# Patient Record
Sex: FEMALE | Race: WHITE | NOT HISPANIC OR LATINO
[De-identification: names, ages, dates, MRNs, and addresses within clinical notes are randomized per-mention and may not be internally consistent; named-entity substitution may affect disease eponyms.]

---

## 2019-07-17 PROBLEM — Z00.00 ENCOUNTER FOR PREVENTIVE HEALTH EXAMINATION: Status: ACTIVE | Noted: 2019-07-17

## 2019-07-22 ENCOUNTER — APPOINTMENT (OUTPATIENT)
Dept: GYNECOLOGIC ONCOLOGY | Facility: CLINIC | Age: 44
End: 2019-07-22
Payer: MEDICAID

## 2019-07-22 VITALS
DIASTOLIC BLOOD PRESSURE: 73 MMHG | OXYGEN SATURATION: 97 % | WEIGHT: 184.5 LBS | HEART RATE: 87 BPM | BODY MASS INDEX: 34.83 KG/M2 | HEIGHT: 61 IN | SYSTOLIC BLOOD PRESSURE: 109 MMHG

## 2019-07-22 DIAGNOSIS — N88.2 STRICTURE AND STENOSIS OF CERVIX UTERI: ICD-10-CM

## 2019-07-22 DIAGNOSIS — N95.1 MENOPAUSAL AND FEMALE CLIMACTERIC STATES: ICD-10-CM

## 2019-07-22 PROCEDURE — 99205 OFFICE O/P NEW HI 60 MIN: CPT

## 2019-07-22 RX ORDER — DIAZEPAM 5 MG/1
5 TABLET ORAL
Refills: 0 | Status: ACTIVE | COMMUNITY

## 2019-07-22 NOTE — HISTORY OF PRESENT ILLNESS
[FreeTextEntry1] : Problem\par 1) Thickened endometrium\par 2) Cervical Stenosis\par \par Previous Therapy\par 1) Pap nilm, HPV+ 5/2019\par 2) Pelvic US 6/20/19\par     a) Uterus 7x2.5x3.4, endometrial thickness of 0.7cm. Left ovary with 2.2 simple cyst\par \par \par 45 yo referred by Dr. Ty for pelvic pain.  she went to Dr. Ty 3 years ago and had a LEEP procedure.  She is not having constant pain now, she is feels like she is having a menstrual cycle with pain, but has no menses.  She does not have hot flashes often, but she does have night sweats.  She has not had a menstrual cycle in 2 years.  Her mother was 58 y when she went through menopause.  She takes tylenol for the pain.  She has had no menses since the LEEP. Had failed D&C, hysteroscopy 2 weeks ago with Dr. Ty

## 2019-08-13 ENCOUNTER — APPOINTMENT (OUTPATIENT)
Dept: GYNECOLOGIC ONCOLOGY | Facility: HOSPITAL | Age: 44
End: 2019-08-13

## 2019-09-10 ENCOUNTER — APPOINTMENT (OUTPATIENT)
Dept: GYNECOLOGIC ONCOLOGY | Facility: HOSPITAL | Age: 44
End: 2019-09-10

## 2019-09-25 RX ORDER — IBUPROFEN 800 MG/1
800 TABLET, FILM COATED ORAL 3 TIMES DAILY
Qty: 90 | Refills: 1 | Status: ACTIVE | COMMUNITY
Start: 2019-09-25 | End: 1900-01-01

## 2019-09-25 RX ORDER — DOCUSATE SODIUM 100 MG/1
100 CAPSULE ORAL TWICE DAILY
Qty: 60 | Refills: 0 | Status: ACTIVE | COMMUNITY
Start: 2019-09-25 | End: 1900-01-01

## 2019-09-25 RX ORDER — OXYCODONE AND ACETAMINOPHEN 5; 325 MG/1; MG/1
5-325 TABLET ORAL
Qty: 15 | Refills: 0 | Status: ACTIVE | COMMUNITY
Start: 2019-09-25 | End: 1900-01-01

## 2019-09-26 ENCOUNTER — RESULT REVIEW (OUTPATIENT)
Age: 44
End: 2019-09-26

## 2019-09-26 ENCOUNTER — APPOINTMENT (OUTPATIENT)
Dept: GYNECOLOGIC ONCOLOGY | Facility: HOSPITAL | Age: 44
End: 2019-09-26

## 2019-09-26 ENCOUNTER — OUTPATIENT (OUTPATIENT)
Dept: OUTPATIENT SERVICES | Facility: HOSPITAL | Age: 44
LOS: 1 days | Discharge: HOME | End: 2019-09-26
Payer: COMMERCIAL

## 2019-09-26 VITALS
SYSTOLIC BLOOD PRESSURE: 113 MMHG | TEMPERATURE: 98 F | RESPIRATION RATE: 16 BRPM | DIASTOLIC BLOOD PRESSURE: 74 MMHG | OXYGEN SATURATION: 97 % | HEIGHT: 60 IN | HEART RATE: 85 BPM | WEIGHT: 183.42 LBS

## 2019-09-26 VITALS
RESPIRATION RATE: 20 BRPM | OXYGEN SATURATION: 94 % | TEMPERATURE: 98 F | SYSTOLIC BLOOD PRESSURE: 100 MMHG | DIASTOLIC BLOOD PRESSURE: 58 MMHG | HEART RATE: 104 BPM

## 2019-09-26 LAB
BLD GP AB SCN SERPL QL: NEGATIVE — SIGNIFICANT CHANGE UP
GLUCOSE BLDC GLUCOMTR-MCNC: 86 MG/DL — SIGNIFICANT CHANGE UP (ref 70–99)
RH IG SCN BLD-IMP: POSITIVE — SIGNIFICANT CHANGE UP

## 2019-09-26 PROCEDURE — 86900 BLOOD TYPING SEROLOGIC ABO: CPT

## 2019-09-26 PROCEDURE — 86850 RBC ANTIBODY SCREEN: CPT

## 2019-09-26 PROCEDURE — 58552 LAPARO-VAG HYST INCL T/O: CPT

## 2019-09-26 PROCEDURE — 88307 TISSUE EXAM BY PATHOLOGIST: CPT

## 2019-09-26 PROCEDURE — 82962 GLUCOSE BLOOD TEST: CPT

## 2019-09-26 PROCEDURE — 58573 TLH W/T/O UTERUS OVER 250 G: CPT

## 2019-09-26 PROCEDURE — S2900: CPT

## 2019-09-26 PROCEDURE — 86901 BLOOD TYPING SEROLOGIC RH(D): CPT

## 2019-09-26 RX ORDER — METOCLOPRAMIDE HCL 10 MG
10 TABLET ORAL ONCE
Refills: 0 | Status: DISCONTINUED | OUTPATIENT
Start: 2019-09-26 | End: 2019-09-26

## 2019-09-26 RX ORDER — CELECOXIB 200 MG/1
400 CAPSULE ORAL ONCE
Refills: 0 | Status: COMPLETED | OUTPATIENT
Start: 2019-09-26 | End: 2019-09-26

## 2019-09-26 RX ORDER — KETOROLAC TROMETHAMINE 30 MG/ML
30 SYRINGE (ML) INJECTION ONCE
Refills: 0 | Status: DISCONTINUED | OUTPATIENT
Start: 2019-09-26 | End: 2019-09-26

## 2019-09-26 RX ORDER — HYDROMORPHONE HYDROCHLORIDE 2 MG/ML
0.5 INJECTION INTRAMUSCULAR; INTRAVENOUS; SUBCUTANEOUS ONCE
Refills: 0 | Status: DISCONTINUED | OUTPATIENT
Start: 2019-09-26 | End: 2019-09-26

## 2019-09-26 RX ORDER — GABAPENTIN 400 MG/1
600 CAPSULE ORAL ONCE
Refills: 0 | Status: COMPLETED | OUTPATIENT
Start: 2019-09-26 | End: 2019-09-26

## 2019-09-26 RX ORDER — ONDANSETRON 8 MG/1
4 TABLET, FILM COATED ORAL ONCE
Refills: 0 | Status: DISCONTINUED | OUTPATIENT
Start: 2019-09-26 | End: 2019-09-26

## 2019-09-26 RX ORDER — ACETAMINOPHEN 500 MG
1000 TABLET ORAL ONCE
Refills: 0 | Status: COMPLETED | OUTPATIENT
Start: 2019-09-26 | End: 2019-09-26

## 2019-09-26 RX ORDER — OXYCODONE AND ACETAMINOPHEN 5; 325 MG/1; MG/1
2 TABLET ORAL ONCE
Refills: 0 | Status: DISCONTINUED | OUTPATIENT
Start: 2019-09-26 | End: 2019-09-26

## 2019-09-26 RX ORDER — OXYCODONE HYDROCHLORIDE 5 MG/1
1 TABLET ORAL
Qty: 12 | Refills: 0
Start: 2019-09-26 | End: 2019-09-28

## 2019-09-26 RX ORDER — HEPARIN SODIUM 5000 [USP'U]/ML
5000 INJECTION INTRAVENOUS; SUBCUTANEOUS ONCE
Refills: 0 | Status: COMPLETED | OUTPATIENT
Start: 2019-09-26 | End: 2019-09-26

## 2019-09-26 RX ADMIN — Medication 1000 MILLIGRAM(S): at 11:29

## 2019-09-26 RX ADMIN — Medication 30 MILLIGRAM(S): at 17:39

## 2019-09-26 RX ADMIN — HEPARIN SODIUM 5000 UNIT(S): 5000 INJECTION INTRAVENOUS; SUBCUTANEOUS at 11:28

## 2019-09-26 RX ADMIN — CELECOXIB 400 MILLIGRAM(S): 200 CAPSULE ORAL at 11:30

## 2019-09-26 RX ADMIN — HYDROMORPHONE HYDROCHLORIDE 0.5 MILLIGRAM(S): 2 INJECTION INTRAMUSCULAR; INTRAVENOUS; SUBCUTANEOUS at 16:02

## 2019-09-26 RX ADMIN — OXYCODONE AND ACETAMINOPHEN 2 TABLET(S): 5; 325 TABLET ORAL at 18:00

## 2019-09-26 RX ADMIN — HYDROMORPHONE HYDROCHLORIDE 0.5 MILLIGRAM(S): 2 INJECTION INTRAMUSCULAR; INTRAVENOUS; SUBCUTANEOUS at 16:13

## 2019-09-26 RX ADMIN — Medication 30 MILLIGRAM(S): at 17:13

## 2019-09-26 RX ADMIN — OXYCODONE AND ACETAMINOPHEN 2 TABLET(S): 5; 325 TABLET ORAL at 17:56

## 2019-09-26 RX ADMIN — GABAPENTIN 600 MILLIGRAM(S): 400 CAPSULE ORAL at 11:29

## 2019-09-26 NOTE — BRIEF OPERATIVE NOTE - NSICDXBRIEFPROCEDURE_GEN_ALL_CORE_FT
PROCEDURES:  Hysterectomy, total, laparoscopic, with bilateral salpingo-oophorectomy and cystourethroscopy 26-Sep-2019 15:28:32 robot assisted Emmanuelle Yang

## 2019-09-26 NOTE — BRIEF OPERATIVE NOTE - OPERATION/FINDINGS
cervical stenosis.  Grossly normal uterus, fallopian tubes, and ovaries  no intraabdominal abnormalities

## 2019-09-30 LAB — SURGICAL PATHOLOGY STUDY: SIGNIFICANT CHANGE UP

## 2019-10-03 ENCOUNTER — APPOINTMENT (OUTPATIENT)
Dept: GYNECOLOGIC ONCOLOGY | Facility: CLINIC | Age: 44
End: 2019-10-03
Payer: MEDICAID

## 2019-10-03 VITALS
SYSTOLIC BLOOD PRESSURE: 132 MMHG | DIASTOLIC BLOOD PRESSURE: 79 MMHG | BODY MASS INDEX: 34.74 KG/M2 | WEIGHT: 184 LBS | HEIGHT: 61 IN

## 2019-10-03 DIAGNOSIS — N83.00 "FOLLICULAR CYST OF OVARY, UNSPECIFIED SIDE": ICD-10-CM

## 2019-10-03 DIAGNOSIS — R10.2 PELVIC AND PERINEAL PAIN: ICD-10-CM

## 2019-10-03 DIAGNOSIS — R93.89 ABNORMAL FINDINGS ON DIAGNOSTIC IMAGING OF OTHER SPECIFIED BODY STRUCTURES: ICD-10-CM

## 2019-10-03 DIAGNOSIS — Z86.19 PERSONAL HISTORY OF OTHER INFECTIOUS AND PARASITIC DISEASES: ICD-10-CM

## 2019-10-03 PROCEDURE — 99024 POSTOP FOLLOW-UP VISIT: CPT

## 2019-10-03 NOTE — DISCUSSION/SUMMARY
[FreeTextEntry2] : Cervical stenosis from LEEP or Menopause [FreeTextEntry1] : With the age of diagrams, I reviewed the findings.  Disease processes and pathogenesis have been discussed.  We discussed options for management which includes repeat pelvic ultrasound and monitor symptoms, dilate the cervix and leave in a malincot catheter. and treat with estrogen or hysterectomy. Hysterectomy approaches including vaginal, laparoscopic, and open.  Risks benefits and alternatives of all have been discussed with the patient.  At this point is my recommendation that the patient proceed with advanced laparoscopic robotic-assisted total hysterectomy with bilateral salpingectomy.  The robotic procedure, inherent risks and benefits, surgical incisions have been reviewed in detail.  Complications that include, but are not limited to: bleeding, infection, injury to other organs including bowel, bladder, ureters, blood vessels, nerves, infections, blood clots, lymphedema, pneumonia, wound complications and prolonged hospital stay have all been discussed with the patient. I have also provided her with the diagrams.  \par \par All questions were answered to the patient’s apparent satisfaction. Patient has agreed to the above procedure, and we'll schedule at her convenience. \par []Medical Clearance [Reviewed Clinical Lab Test(s)] : Results of clinical tests were reviewed. [Discuss Tests w/Referring Providers] : Results of labs/radiology studies and the treatment recommendations were discussed with performing/referring physician. [Pre Op] : The differential diagnosis was discussed in detail. The indications, risks, benefits and alternatives were discussed. [unfilled] expressed an understanding of the treatment rationale and her questions were answered to her apparent satisfaction.

## 2019-10-03 NOTE — CHIEF COMPLAINT
[FreeTextEntry1] : New patient consult [FreeTextEntry3] : [place]mese but [employer] was unable to provide service  [Friend] : friend [[employer] ] : provided by [employer]

## 2019-10-03 NOTE — REVIEW OF SYSTEMS
[Depression] : depression [Dyspareunia] : dyspareunia [FreeTextEntry3] : pain in pelvis making her depressed [FreeTextEntry4] : pelvic pain [Negative] : Musculoskeletal [Hot Flashes] : hot flashes

## 2019-10-03 NOTE — PAST MEDICAL HISTORY
[FreeTextEntry5] : Vaginal delivery x2\par menses stopped after LEEP in 2017 [Postmenopausal] : The patient is postmenopausal [Menopause Age____] : age at menopause was [unfilled] [Approximately ___] : the LMP was approximately [unfilled] [Total Preg ___] : G[unfilled] [Living ___] : Living: [unfilled]

## 2019-10-04 PROBLEM — Z86.19 HISTORY OF HPV INFECTION: Status: ACTIVE | Noted: 2019-07-22

## 2019-10-04 PROBLEM — R93.89 THICKENED ENDOMETRIUM: Status: ACTIVE | Noted: 2019-07-22

## 2019-10-04 PROBLEM — R10.2 FEMALE PELVIC PAIN: Status: ACTIVE | Noted: 2019-07-22

## 2019-10-04 PROBLEM — N83.00 OVARIAN CYST, FOLLICULAR: Status: ACTIVE | Noted: 2019-07-22

## 2019-10-04 NOTE — HISTORY OF PRESENT ILLNESS
[FreeTextEntry1] : Problem\par 1) Thickened endometrium\par 2) Cervical Stenosis\par \par Previous Therapy\par 1) Pap nilm, HPV+ 5/2019\par 2) Pelvic US 6/20/19\par     a) Uterus 7x2.5x3.4, endometrial thickness of 0.7cm. Left ovary with 2.2 simple cyst\par 3) ERAS robotic laparoscopic hysterectomy Bilateral salpingectomy 9/26/19. \par   a) uterus, cervix, bilateral fallopian tubes, hysterectomy and bilateral salpingectomy:\par      [] Adenomyosis\par      [] Proliferative endometrium\par      [] Unremarkable cervix\par      [] Unremarkable bilateral fallopian tubes\par \par 45 y/o 1 week post s/p ERAS robotic laparoscopic hysterectomy Bilateral salpingectomy on 9/26/2019. *NO OP NOTE*.  Doing well, no complaints.

## 2019-10-04 NOTE — PHYSICAL EXAM
[de-identified] : Incision sites noted, healing well, no signs of infection- no redness, drainage or bleeding noted. + Bowel sounds, soft, non distended, non tender, no gaurding.  [de-identified] : Insision sites all healing well.

## 2019-10-04 NOTE — DISCUSSION/SUMMARY
[FreeTextEntry2] : Cervical stenosis from LEEP or Menopause [FreeTextEntry1] : 45 y/o s/p 1 week post s/p ERAS robotic laparoscopic hysterectomy Bilateral salpingectomy. Doing well, no complaints. \par [] Return in 3 weeks for vaginal cuff evaluation\par [] Pt advised to avoid heavy lifting, post op precautions given

## 2019-10-04 NOTE — PHYSICAL EXAM
[de-identified] : Incision sites noted, healing well, no signs of infection- no redness, drainage or bleeding noted. + Bowel sounds, soft, non distended, non tender, no gaurding.  [de-identified] : Insision sites all healing well.

## 2019-10-04 NOTE — HISTORY OF PRESENT ILLNESS
[FreeTextEntry1] : Problem\par 1) Thickened endometrium\par 2) Cervical Stenosis\par \par Previous Therapy\par 1) Pap nilm, HPV+ 5/2019\par 2) Pelvic US 6/20/19\par     a) Uterus 7x2.5x3.4, endometrial thickness of 0.7cm. Left ovary with 2.2 simple cyst\par 3) ERAS robotic laparoscopic hysterectomy Bilateral salpingectomy 9/26/19. \par   a) uterus, cervix, bilateral fallopian tubes, hysterectomy and bilateral salpingectomy:\par      [] Adenomyosis\par      [] Proliferative endometrium\par      [] Unremarkable cervix\par      [] Unremarkable bilateral fallopian tubes\par \par 43 y/o 1 week post s/p ERAS robotic laparoscopic hysterectomy Bilateral salpingectomy on 9/26/2019. *NO OP NOTE*.  Doing well, no complaints.

## 2019-10-04 NOTE — CHIEF COMPLAINT
[FreeTextEntry1] : New patient consult [FreeTextEntry3] : [place]oese but [employer] was unable to provide service  [TWNoteComboBox1] : Other

## 2019-10-04 NOTE — CHIEF COMPLAINT
[FreeTextEntry1] : New patient consult [FreeTextEntry3] : [place] but [employer] was unable to provide service  [TWNoteComboBox1] : Other

## 2019-10-04 NOTE — REVIEW OF SYSTEMS
[Negative] : Genitourinary [Depression] : no depression [Dyspareunia] : no dyspareunia [FreeTextEntry3] : Pt is feeling good, denies depression [FreeTextEntry4] : No urinary complaints [de-identified] : Pt denies any SOB  [de-identified] : Pt denies any calf pain

## 2019-10-23 ENCOUNTER — APPOINTMENT (OUTPATIENT)
Dept: GYNECOLOGIC ONCOLOGY | Facility: CLINIC | Age: 44
End: 2019-10-23
Payer: MEDICAID

## 2019-10-30 ENCOUNTER — APPOINTMENT (OUTPATIENT)
Dept: GYNECOLOGIC ONCOLOGY | Facility: CLINIC | Age: 44
End: 2019-10-30
Payer: MEDICAID

## 2019-10-31 ENCOUNTER — APPOINTMENT (OUTPATIENT)
Dept: GYNECOLOGIC ONCOLOGY | Facility: CLINIC | Age: 44
End: 2019-10-31
Payer: MEDICAID

## 2019-10-31 PROCEDURE — 99024 POSTOP FOLLOW-UP VISIT: CPT

## 2019-11-01 LAB
APPEARANCE: CLEAR
BILIRUBIN URINE: NEGATIVE
BLOOD URINE: NEGATIVE
COLOR: NORMAL
GLUCOSE QUALITATIVE U: NEGATIVE
KETONES URINE: NEGATIVE
LEUKOCYTE ESTERASE URINE: NEGATIVE
NITRITE URINE: NEGATIVE
PH URINE: 6.5
PROTEIN URINE: NEGATIVE
SPECIFIC GRAVITY URINE: 1.02
UROBILINOGEN URINE: NORMAL

## 2019-11-01 NOTE — CHIEF COMPLAINT
[FreeTextEntry1] : New patient consult [FreeTextEntry3] : Portuguese but [employer] was unable to provide service  [TWNoteComboBox1] : Other

## 2019-11-01 NOTE — DISCUSSION/SUMMARY
[FreeTextEntry2] : Cervical stenosis from LEEP or Menopause [FreeTextEntry1] : excellent post operative recovery\par Benign pathology reviewed in detail\par Follow up with Denisa Butler PA-C

## 2019-11-01 NOTE — HISTORY OF PRESENT ILLNESS
[FreeTextEntry1] : Problem\par 1) Thickened endometrium\par 2) Cervical Stenosis\par \par Previous Therapy\par 1) Pap nilm, HPV+ 5/2019\par 2) Pelvic US 6/20/19\par     a) Uterus 7x2.5x3.4, endometrial thickness of 0.7cm. Left ovary with 2.2 simple cyst\par 3) Robotic laparoscopic hysterectomy Bilateral salpingectomy 9/26/19. \par   a) adenomyosis, otherwise unremarkable\par \par Here for 1 month post operative follow up. feeling well. Off pain medications and feeling back to normal

## 2019-11-01 NOTE — REVIEW OF SYSTEMS
[Depression] : no depression [Dyspareunia] : no dyspareunia [FreeTextEntry3] : Pt is feeling good, denies depression [FreeTextEntry4] : No urinary complaints [de-identified] : Pt denies any SOB  [de-identified] : Pt denies any calf pain

## 2019-11-05 LAB — BACTERIA UR CULT: NORMAL

## 2022-05-02 NOTE — ASU PATIENT PROFILE, ADULT - PACKS PER DAY
Does the Patient have a central line?  yes:   Device: PICC  Central Line Site: Right  and Upper arm  Central Line Site Appearance: clear and WDL  Is this a port? No  Blood obtained and sent to lab.   Site Care: Aseptic site care per protocol and Injection caps changed/applied  Comments: ESCORTED BY WHEELCHAIR TO INFUSION ROOM  Central line flushed with: 20 ml 0.9 normal saline  Central line removed: no      Future Appointments   Date Time Provider [address]   5/6/2022 12:30 PM [facility]   5/6/2022  1:00 PM Sonia Carrasco NP [facility]   5/10/2022  1:00 PM [facility]   5/10/2022  1:15 PM [facility] TREATMENT CHAIR [facility]   5/13/2022 12:30 PM [facility]   5/13/2022  1:00 PM Sonia Carrasco NP [facility]   5/16/2022  1:00 PM [facility]   5/16/2022  1:15 PM [facility] TREATMENT CHAIR [facility]   5/18/2022  1:15 PM [facility]   5/18/2022  1:30 PM Laura Chan DO [facility]   9/13/2022  9:00 AM [address] [place] [address] [facility] [address] [place] [address]   9/20/2022 10:20 AM Isabella Reyna MD [address] 0.05